# Patient Record
Sex: FEMALE | Race: WHITE | URBAN - METROPOLITAN AREA
[De-identification: names, ages, dates, MRNs, and addresses within clinical notes are randomized per-mention and may not be internally consistent; named-entity substitution may affect disease eponyms.]

---

## 2022-09-18 ENCOUNTER — HOSPITAL ENCOUNTER (OUTPATIENT)
Age: 32
Setting detail: OBSERVATION
Discharge: HOME | End: 2022-09-18
Attending: SURGERY | Admitting: SURGERY
Payer: COMMERCIAL

## 2022-09-18 VITALS
HEART RATE: 83 BPM | RESPIRATION RATE: 16 BRPM | TEMPERATURE: 98.8 F | WEIGHT: 132.2 LBS | HEIGHT: 62 IN | OXYGEN SATURATION: 97 % | BODY MASS INDEX: 24.33 KG/M2 | SYSTOLIC BLOOD PRESSURE: 105 MMHG | DIASTOLIC BLOOD PRESSURE: 62 MMHG

## 2022-09-18 PROBLEM — K35.80 ACUTE APPENDICITIS: Status: ACTIVE | Noted: 2022-09-18

## 2022-09-18 PROBLEM — R10.31 RLQ ABDOMINAL PAIN: Status: ACTIVE | Noted: 2022-09-18

## 2022-09-18 LAB
ANION GAP SERPL CALCULATED.3IONS-SCNC: 8 MMOL/L (ref 3–16)
BASOPHILS ABSOLUTE: 0.1 K/UL (ref 0–0.2)
BASOPHILS RELATIVE PERCENT: 0.6 %
BUN BLDV-MCNC: 16 MG/DL (ref 7–20)
CALCIUM SERPL-MCNC: 8.2 MG/DL (ref 8.3–10.6)
CHLORIDE BLD-SCNC: 104 MMOL/L (ref 99–110)
CO2: 25 MMOL/L (ref 21–32)
CREAT SERPL-MCNC: 0.6 MG/DL (ref 0.6–1.1)
EOSINOPHILS ABSOLUTE: 0.5 K/UL (ref 0–0.6)
EOSINOPHILS RELATIVE PERCENT: 6.1 %
GFR AFRICAN AMERICAN: >60
GFR NON-AFRICAN AMERICAN: >60
GLUCOSE BLD-MCNC: 85 MG/DL (ref 70–99)
HCT VFR BLD CALC: 40.7 % (ref 36–48)
HEMOGLOBIN: 13.7 G/DL (ref 12–16)
LACTIC ACID: 0.7 MMOL/L (ref 0.4–2)
LYMPHOCYTES ABSOLUTE: 3.1 K/UL (ref 1–5.1)
LYMPHOCYTES RELATIVE PERCENT: 35.5 %
MAGNESIUM: 1.7 MG/DL (ref 1.8–2.4)
MCH RBC QN AUTO: 31.5 PG (ref 26–34)
MCHC RBC AUTO-ENTMCNC: 33.8 G/DL (ref 31–36)
MCV RBC AUTO: 93.2 FL (ref 80–100)
MONOCYTES ABSOLUTE: 0.5 K/UL (ref 0–1.3)
MONOCYTES RELATIVE PERCENT: 6.2 %
NEUTROPHILS ABSOLUTE: 4.5 K/UL (ref 1.7–7.7)
NEUTROPHILS RELATIVE PERCENT: 51.6 %
PDW BLD-RTO: 13 % (ref 12.4–15.4)
PHOSPHORUS: 3.1 MG/DL (ref 2.5–4.9)
PLATELET # BLD: 173 K/UL (ref 135–450)
PMV BLD AUTO: 7.5 FL (ref 5–10.5)
POTASSIUM SERPL-SCNC: 3.9 MMOL/L (ref 3.5–5.1)
RBC # BLD: 4.37 M/UL (ref 4–5.2)
SODIUM BLD-SCNC: 137 MMOL/L (ref 136–145)
WBC # BLD: 8.7 K/UL (ref 4–11)

## 2022-09-18 PROCEDURE — 83735 ASSAY OF MAGNESIUM: CPT

## 2022-09-18 PROCEDURE — 2580000003 HC RX 258: Performed by: SURGERY

## 2022-09-18 PROCEDURE — 96365 THER/PROPH/DIAG IV INF INIT: CPT

## 2022-09-18 PROCEDURE — 96372 THER/PROPH/DIAG INJ SC/IM: CPT

## 2022-09-18 PROCEDURE — G0379 DIRECT REFER HOSPITAL OBSERV: HCPCS

## 2022-09-18 PROCEDURE — 36415 COLL VENOUS BLD VENIPUNCTURE: CPT

## 2022-09-18 PROCEDURE — 2500000003 HC RX 250 WO HCPCS: Performed by: SURGERY

## 2022-09-18 PROCEDURE — 96375 TX/PRO/DX INJ NEW DRUG ADDON: CPT

## 2022-09-18 PROCEDURE — 80048 BASIC METABOLIC PNL TOTAL CA: CPT

## 2022-09-18 PROCEDURE — 85025 COMPLETE CBC W/AUTO DIFF WBC: CPT

## 2022-09-18 PROCEDURE — 84100 ASSAY OF PHOSPHORUS: CPT

## 2022-09-18 PROCEDURE — 83605 ASSAY OF LACTIC ACID: CPT

## 2022-09-18 PROCEDURE — A4216 STERILE WATER/SALINE, 10 ML: HCPCS | Performed by: SURGERY

## 2022-09-18 PROCEDURE — 6360000002 HC RX W HCPCS: Performed by: SURGERY

## 2022-09-18 PROCEDURE — 99219 PR INITIAL OBSERVATION CARE/DAY 50 MINUTES: CPT | Performed by: SURGERY

## 2022-09-18 PROCEDURE — 96376 TX/PRO/DX INJ SAME DRUG ADON: CPT

## 2022-09-18 PROCEDURE — 6370000000 HC RX 637 (ALT 250 FOR IP): Performed by: SURGERY

## 2022-09-18 PROCEDURE — G0378 HOSPITAL OBSERVATION PER HR: HCPCS

## 2022-09-18 PROCEDURE — 96366 THER/PROPH/DIAG IV INF ADDON: CPT

## 2022-09-18 RX ORDER — ACETAMINOPHEN 325 MG/1
650 TABLET ORAL EVERY 6 HOURS PRN
Status: DISCONTINUED | OUTPATIENT
Start: 2022-09-18 | End: 2022-09-18 | Stop reason: HOSPADM

## 2022-09-18 RX ORDER — OXYCODONE HYDROCHLORIDE 5 MG/1
10 TABLET ORAL EVERY 4 HOURS PRN
Status: DISCONTINUED | OUTPATIENT
Start: 2022-09-18 | End: 2022-09-18 | Stop reason: HOSPADM

## 2022-09-18 RX ORDER — OXYCODONE HYDROCHLORIDE 5 MG/1
5 TABLET ORAL EVERY 4 HOURS PRN
Status: DISCONTINUED | OUTPATIENT
Start: 2022-09-18 | End: 2022-09-18 | Stop reason: HOSPADM

## 2022-09-18 RX ORDER — KETOROLAC TROMETHAMINE 30 MG/ML
30 INJECTION, SOLUTION INTRAMUSCULAR; INTRAVENOUS EVERY 8 HOURS PRN
Status: DISCONTINUED | OUTPATIENT
Start: 2022-09-18 | End: 2022-09-18 | Stop reason: HOSPADM

## 2022-09-18 RX ORDER — ENOXAPARIN SODIUM 100 MG/ML
40 INJECTION SUBCUTANEOUS EVERY 24 HOURS
Status: DISCONTINUED | OUTPATIENT
Start: 2022-09-18 | End: 2022-09-18 | Stop reason: SDUPTHER

## 2022-09-18 RX ORDER — SODIUM CHLORIDE 9 MG/ML
INJECTION, SOLUTION INTRAVENOUS CONTINUOUS
Status: DISCONTINUED | OUTPATIENT
Start: 2022-09-18 | End: 2022-09-18 | Stop reason: HOSPADM

## 2022-09-18 RX ORDER — ONDANSETRON 2 MG/ML
4 INJECTION INTRAMUSCULAR; INTRAVENOUS EVERY 6 HOURS PRN
Status: DISCONTINUED | OUTPATIENT
Start: 2022-09-18 | End: 2022-09-18 | Stop reason: HOSPADM

## 2022-09-18 RX ORDER — ENOXAPARIN SODIUM 100 MG/ML
40 INJECTION SUBCUTANEOUS EVERY 24 HOURS
Status: DISCONTINUED | OUTPATIENT
Start: 2022-09-18 | End: 2022-09-18 | Stop reason: HOSPADM

## 2022-09-18 RX ORDER — NICOTINE 21 MG/24HR
1 PATCH, TRANSDERMAL 24 HOURS TRANSDERMAL DAILY
Status: DISCONTINUED | OUTPATIENT
Start: 2022-09-18 | End: 2022-09-18 | Stop reason: HOSPADM

## 2022-09-18 RX ADMIN — KETOROLAC TROMETHAMINE 30 MG: 30 INJECTION, SOLUTION INTRAMUSCULAR; INTRAVENOUS at 02:31

## 2022-09-18 RX ADMIN — FAMOTIDINE 20 MG: 10 INJECTION INTRAVENOUS at 08:04

## 2022-09-18 RX ADMIN — ONDANSETRON HYDROCHLORIDE 4 MG: 2 INJECTION, SOLUTION INTRAMUSCULAR; INTRAVENOUS at 12:38

## 2022-09-18 RX ADMIN — MEROPENEM 1000 MG: 1 INJECTION, POWDER, FOR SOLUTION INTRAVENOUS at 02:36

## 2022-09-18 RX ADMIN — FAMOTIDINE 20 MG: 10 INJECTION INTRAVENOUS at 02:41

## 2022-09-18 RX ADMIN — MEROPENEM 1000 MG: 1 INJECTION, POWDER, FOR SOLUTION INTRAVENOUS at 10:43

## 2022-09-18 RX ADMIN — KETOROLAC TROMETHAMINE 30 MG: 30 INJECTION, SOLUTION INTRAMUSCULAR; INTRAVENOUS at 10:40

## 2022-09-18 RX ADMIN — ONDANSETRON HYDROCHLORIDE 4 MG: 2 INJECTION, SOLUTION INTRAMUSCULAR; INTRAVENOUS at 02:31

## 2022-09-18 RX ADMIN — ENOXAPARIN SODIUM 40 MG: 100 INJECTION SUBCUTANEOUS at 08:04

## 2022-09-18 RX ADMIN — SODIUM CHLORIDE: 9 INJECTION, SOLUTION INTRAVENOUS at 02:34

## 2022-09-18 ASSESSMENT — PAIN SCALES - GENERAL
PAINLEVEL_OUTOF10: 3
PAINLEVEL_OUTOF10: 5
PAINLEVEL_OUTOF10: 2
PAINLEVEL_OUTOF10: 5
PAINLEVEL_OUTOF10: 5

## 2022-09-18 ASSESSMENT — PAIN DESCRIPTION - DESCRIPTORS
DESCRIPTORS: ACHING

## 2022-09-18 ASSESSMENT — PAIN DESCRIPTION - ORIENTATION
ORIENTATION: RIGHT;LOWER
ORIENTATION: LOWER;RIGHT
ORIENTATION: RIGHT;LOWER

## 2022-09-18 ASSESSMENT — PAIN DESCRIPTION - LOCATION
LOCATION: ABDOMEN

## 2022-09-18 ASSESSMENT — LIFESTYLE VARIABLES: HOW OFTEN DO YOU HAVE A DRINK CONTAINING ALCOHOL: NEVER

## 2022-09-18 NOTE — PROGRESS NOTES
Patient resting in bed; is alert and oriented; call light in reach. Awaiting surgery consult. Vital signs stable. Remains NPO.

## 2022-09-18 NOTE — CARE COORDINATION
Review of chart for any potential discharge needs. No needs identified for discharge intervention at this time. MD and bedside RN  if needs arise please consult case management for discharge intervention. CM not following at this time. Pt's O2 sats are 97% on RA.

## 2022-09-18 NOTE — PROGRESS NOTES
Pt. admitted to [facility] Tele: No:    from [facility] ED    Present evaluation shows pt. is Alert    Telemetry shows No Tele ordered. Pt is young Acute appy pt admitted to surgery. VS   ED Triage Vitals   Enc Vitals Group      /60      Pulse 78      Resp 16      Temp 97.4      Temp src oral      SpO2 98       Weight       Height       Head Circumference       Peak Flow       Pain Score       Pain Loc       Pain Edu? Excl. in [address]?          O2 at 98 % on room air    Skin:   Pink    Respiration:   regular, no distress    Distress noted  no    Agree with unit placement of [facility] at 9/18/2022    Electronically Signed by: Electronically signed by Riya Brown RN on 9/18/22 at 1:51 AM EDT Pt. a

## 2022-09-18 NOTE — H&P
[facility]    Department of General Surgery History & Physical    PATIENT NAME: Jana Angeles   YOB: 1990    ADMISSION DATE: 2022  1:15 AM      TODAY'S DATE: 2022    Reason for admission:  Abdominal pain      HISTORY OF PRESENT ILLNESS:              The patient is a 33 y.o. female who presented to [facility] with fever, nausea and vomiting and headache and associated moderate abdominal pain in the mid and lower abdomen. Bowels are working. She is hungry. CT at [facility] showed dilated appendix but no inflammation. Her lactic acid was elevated. She feels much improved today. She denies abdominal pain now. Past Medical History:    No past medical history on file. Past Surgical History:        Procedure Laterality Date     SECTION Bilateral        Current Medications:   Current Facility-Administered Medications: 0.9 % sodium chloride infusion, , IntraVENous, Continuous  HYDROmorphone (DILAUDID) injection 0.25 mg, 0.25 mg, IntraVENous, Q4H PRN  ketorolac (TORADOL) injection 30 mg, 30 mg, IntraVENous, Q8H PRN  famotidine (PEPCID) 20 mg in sodium chloride (PF) 10 mL injection, 20 mg, IntraVENous, BID  ondansetron (ZOFRAN) injection 4 mg, 4 mg, IntraVENous, Q6H PRN  acetaminophen (TYLENOL) tablet 650 mg, 650 mg, Oral, Q6H PRN  [COMPLETED] meropenem (MERREM) 1,000 mg in sodium chloride 0.9 % 100 mL IVPB (mini-bag), 1,000 mg, IntraVENous, Once **FOLLOWED BY** meropenem (MERREM) 1,000 mg in sodium chloride 0.9 % 100 mL IVPB (mini-bag), 1,000 mg, IntraVENous, Q8H  enoxaparin (LOVENOX) injection 40 mg, 40 mg, SubCUTAneous, Q24H  nicotine (NICODERM CQ) 14 MG/24HR 1 patch, 1 patch, TransDERmal, Daily  oxyCODONE (ROXICODONE) immediate release tablet 5 mg, 5 mg, Oral, Q4H PRN **OR** oxyCODONE (ROXICODONE) immediate release tablet 10 mg, 10 mg, Oral, Q4H PRN  Prior to Admission medications    Not on File        Allergies:  Patient has no known allergies.     Social History: TOBACCO:   reports that she does not have a smoking history on file. She has never used smokeless tobacco.  ETOH:   reports no history of alcohol use. DRUGS:   reports current drug use. Drug: IV. Family History:    No family history on file. REVIEW OF SYSTEMS:  She reports no complaints related to the eyes, ears , nose throat or mouth. She denies weight loss. No chest pain. No SOB. No urinary complaints. No musculoskeletal complaints. No skin rashes. No neurologic deficits. No bleeding tendencies. GI complaints include none currently. PHYSICAL EXAM:  VITALS:  BP 94/62   Pulse 81   Temp 99.2 °F (37.3 °C) (Oral)   Resp 16   Ht 5' 2\" (1.575 m)   Wt 132 lb 3.2 oz (60 kg)   LMP 08/20/2022   SpO2 99%   BMI 24.18 kg/m²     CONSTITUTIONAL:  alert, no apparent distress and thin  EYES:  sclera clear  ENT:  normocepalic, without obvious abnormality  NECK:  supple, symmetrical, trachea midline   LUNGS:  clear to auscultation  CARDIOVASCULAR:  regular rate and rhythm   ABDOMEN:     non-distended, non-tender even with deep palpation in RLQ ,and soft  MUSCULOSKELETAL:  No pitting edema lower extremities  NEUROLOGIC:  Mental Status Exam:  Level of Alertness:   awake  Orientation:   person, place, time  SKIN:  no rashes    DATA:    CBC:   Recent Labs     09/18/22  0552   WBC 8.7   HGB 13.7   HCT 40.7        BMP:    Recent Labs     09/18/22  0552      K 3.9      CO2 25   BUN 16   CREATININE 0.6   GLUCOSE 85         Radiology Review:  CT at [facility] with dilated appendix without inflammation    ASSESSMENT:  RLQ abdominal pain - resolved  Lactic acidosis - resolved    PLAN:  Suspect she had dehydration from N/V likely from GI illness with N/V/D and fever. Exam and history not consistent with appendicitis and CT non diagnostic. Full liquids trial and discharge if she tolerates.       Paty Carter MD     [address] Surgery

## 2022-09-18 NOTE — DISCHARGE SUMMARY
Surgery Discharge Summary    Patient Identification  Barbara Lewis is a 33 y.o. female. :  1990  Admit Date:  2022    Discharge date:   No discharge date for patient encounter. Disposition: home    Discharge Diagnoses:   Patient Active Problem List   Diagnosis    Mood disorder, drug-induced ([facility] [address]    Narcotic withdrawal ([facility] [address]    Complicated grief    Acute appendicitis    RLQ abdominal pain         Consults: none    Surgery: None    Patient Instructions: Activity: activity as tolerated  Diet: clear liquids, advance as tolerated  Wound Care: none needed    Follow-up with PCP in 2 weeks. See pre-printed instructions in chart and given to patient upon discharge. Discharge Medications:        Medication List      You have not been prescribed any medications. Condition at discharge: Stable    HPI and Hospital Course: Admitted with RLQ pain. Improved with hydration and time. Tolerated PO. Benign abdominal exam.  Normal labs. Follow up as needed.          Jerri Jauregui MD    [facility] Surgery

## 2024-07-08 ENCOUNTER — HOSPITAL ENCOUNTER (INPATIENT)
Age: 34
LOS: 2 days | Discharge: HOME | End: 2024-07-11
Attending: STUDENT IN AN ORGANIZED HEALTH CARE EDUCATION/TRAINING PROGRAM | Admitting: PSYCHIATRY & NEUROLOGY
Payer: COMMERCIAL

## 2024-07-08 DIAGNOSIS — F11.23 OPIOID DEPENDENCE WITH WITHDRAWAL (HCC): Primary | ICD-10-CM

## 2024-07-08 DIAGNOSIS — F29 PSYCHOSIS, UNSPECIFIED PSYCHOSIS TYPE (HCC): ICD-10-CM

## 2024-07-08 LAB
ALBUMIN SERPL-MCNC: 4 G/DL (ref 3.4–5)
ALBUMIN/GLOB SERPL: 1.4 {RATIO} (ref 1.1–2.2)
ALP SERPL-CCNC: 57 U/L (ref 40–129)
ALT SERPL-CCNC: 31 U/L (ref 10–40)
AMPHETAMINES UR QL SCN>1000 NG/ML: ABNORMAL
ANION GAP SERPL CALCULATED.3IONS-SCNC: 9 MMOL/L (ref 3–16)
APAP SERPL-MCNC: <5 UG/ML (ref 10–30)
AST SERPL-CCNC: 37 U/L (ref 15–37)
BARBITURATES UR QL SCN>200 NG/ML: ABNORMAL
BASOPHILS # BLD: 0.1 K/UL (ref 0–0.2)
BASOPHILS NFR BLD: 0.9 %
BENZODIAZ UR QL SCN>200 NG/ML: ABNORMAL
BILIRUB SERPL-MCNC: <0.2 MG/DL (ref 0–1)
BILIRUB UR QL STRIP.AUTO: NEGATIVE
BUN SERPL-MCNC: 5 MG/DL (ref 7–20)
CALCIUM SERPL-MCNC: 9.1 MG/DL (ref 8.3–10.6)
CANNABINOIDS UR QL SCN>50 NG/ML: ABNORMAL
CHLORIDE SERPL-SCNC: 102 MMOL/L (ref 99–110)
CLARITY UR: CLEAR
CO2 SERPL-SCNC: 27 MMOL/L (ref 21–32)
COCAINE UR QL SCN: ABNORMAL
COLOR UR: YELLOW
CREAT SERPL-MCNC: <0.5 MG/DL (ref 0.6–1.1)
DEPRECATED RDW RBC AUTO: 13.4 % (ref 12.4–15.4)
DRUG SCREEN COMMENT UR-IMP: ABNORMAL
EOSINOPHIL # BLD: 0.3 K/UL (ref 0–0.6)
EOSINOPHIL NFR BLD: 5 %
ETHANOLAMINE SERPL-MCNC: NORMAL MG/DL (ref 0–0.08)
FENTANYL SCREEN, URINE: POSITIVE
GFR SERPLBLD CREATININE-BSD FMLA CKD-EPI: >90 ML/MIN/{1.73_M2}
GLUCOSE SERPL-MCNC: 107 MG/DL (ref 70–99)
GLUCOSE UR STRIP.AUTO-MCNC: NEGATIVE MG/DL
HCG SERPL QL: NEGATIVE
HCT VFR BLD AUTO: 39.7 % (ref 36–48)
HGB BLD-MCNC: 13.5 G/DL (ref 12–16)
HGB UR QL STRIP.AUTO: NEGATIVE
KETONES UR STRIP.AUTO-MCNC: NEGATIVE MG/DL
LEUKOCYTE ESTERASE UR QL STRIP.AUTO: NEGATIVE
LYMPHOCYTES # BLD: 2.3 K/UL (ref 1–5.1)
LYMPHOCYTES NFR BLD: 34.8 %
MAGNESIUM SERPL-MCNC: 1.9 MG/DL (ref 1.8–2.4)
MCH RBC QN AUTO: 32.3 PG (ref 26–34)
MCHC RBC AUTO-ENTMCNC: 34.1 G/DL (ref 31–36)
MCV RBC AUTO: 94.6 FL (ref 80–100)
METHADONE UR QL SCN>300 NG/ML: ABNORMAL
MONOCYTES # BLD: 0.5 K/UL (ref 0–1.3)
MONOCYTES NFR BLD: 7.9 %
NEUTROPHILS # BLD: 3.4 K/UL (ref 1.7–7.7)
NEUTROPHILS NFR BLD: 51.4 %
NITRITE UR QL STRIP.AUTO: NEGATIVE
OPIATES UR QL SCN>300 NG/ML: ABNORMAL
OXYCODONE UR QL SCN: ABNORMAL
PCP UR QL SCN>25 NG/ML: ABNORMAL
PH UR STRIP.AUTO: 7.5 [PH] (ref 5–8)
PH UR STRIP: 7.5 [PH]
PLATELET # BLD AUTO: 192 K/UL (ref 135–450)
PMV BLD AUTO: 8.4 FL (ref 5–10.5)
POTASSIUM SERPL-SCNC: 3.5 MMOL/L (ref 3.5–5.1)
PROT SERPL-MCNC: 6.9 G/DL (ref 6.4–8.2)
PROT UR STRIP.AUTO-MCNC: NEGATIVE MG/DL
RBC # BLD AUTO: 4.2 M/UL (ref 4–5.2)
SALICYLATES SERPL-MCNC: <0.3 MG/DL (ref 15–30)
SODIUM SERPL-SCNC: 138 MMOL/L (ref 136–145)
SP GR UR STRIP.AUTO: 1.02 (ref 1–1.03)
UA COMPLETE W REFLEX CULTURE PNL UR: NORMAL
UA DIPSTICK W REFLEX MICRO PNL UR: NORMAL
URN SPEC COLLECT METH UR: NORMAL
UROBILINOGEN UR STRIP-ACNC: 0.2 E.U./DL
WBC # BLD AUTO: 6.6 K/UL (ref 4–11)

## 2024-07-08 PROCEDURE — 80307 DRUG TEST PRSMV CHEM ANLYZR: CPT

## 2024-07-08 PROCEDURE — 36415 COLL VENOUS BLD VENIPUNCTURE: CPT

## 2024-07-08 PROCEDURE — 80053 COMPREHEN METABOLIC PANEL: CPT

## 2024-07-08 PROCEDURE — 84703 CHORIONIC GONADOTROPIN ASSAY: CPT

## 2024-07-08 PROCEDURE — 84443 ASSAY THYROID STIM HORMONE: CPT

## 2024-07-08 PROCEDURE — 83735 ASSAY OF MAGNESIUM: CPT

## 2024-07-08 PROCEDURE — 80143 DRUG ASSAY ACETAMINOPHEN: CPT

## 2024-07-08 PROCEDURE — 81003 URINALYSIS AUTO W/O SCOPE: CPT

## 2024-07-08 PROCEDURE — 82077 ASSAY SPEC XCP UR&BREATH IA: CPT

## 2024-07-08 PROCEDURE — 99285 EMERGENCY DEPT VISIT HI MDM: CPT

## 2024-07-08 PROCEDURE — 80179 DRUG ASSAY SALICYLATE: CPT

## 2024-07-08 PROCEDURE — 85025 COMPLETE CBC W/AUTO DIFF WBC: CPT

## 2024-07-08 PROCEDURE — 84439 ASSAY OF FREE THYROXINE: CPT

## 2024-07-08 ASSESSMENT — PAIN - FUNCTIONAL ASSESSMENT: PAIN_FUNCTIONAL_ASSESSMENT: 0-10

## 2024-07-08 ASSESSMENT — PAIN DESCRIPTION - LOCATION: LOCATION: BACK

## 2024-07-08 ASSESSMENT — PAIN DESCRIPTION - DESCRIPTORS: DESCRIPTORS: ACHING

## 2024-07-08 ASSESSMENT — PAIN DESCRIPTION - ORIENTATION: ORIENTATION: LOWER

## 2024-07-08 ASSESSMENT — PAIN SCALES - GENERAL: PAINLEVEL_OUTOF10: 6

## 2024-07-09 PROBLEM — F29 PSYCHOSIS, UNSPECIFIED PSYCHOSIS TYPE (HCC): Status: ACTIVE | Noted: 2024-07-09

## 2024-07-09 LAB
EKG ATRIAL RATE: 82 BPM
EKG ATRIAL RATE: 90 BPM
EKG DIAGNOSIS: NORMAL
EKG DIAGNOSIS: NORMAL
EKG P AXIS: 68 DEGREES
EKG P AXIS: 72 DEGREES
EKG P-R INTERVAL: 146 MS
EKG P-R INTERVAL: 146 MS
EKG Q-T INTERVAL: 356 MS
EKG Q-T INTERVAL: 362 MS
EKG QRS DURATION: 94 MS
EKG QRS DURATION: 96 MS
EKG QTC CALCULATION (BAZETT): 415 MS
EKG QTC CALCULATION (BAZETT): 442 MS
EKG R AXIS: 73 DEGREES
EKG R AXIS: 75 DEGREES
EKG T AXIS: 55 DEGREES
EKG T AXIS: 58 DEGREES
EKG VENTRICULAR RATE: 82 BPM
EKG VENTRICULAR RATE: 90 BPM
T4 FREE SERPL-MCNC: 2.1 NG/DL (ref 0.9–1.8)
TROPONIN, HIGH SENSITIVITY: 6 NG/L (ref 0–14)
TSH SERPL DL<=0.005 MIU/L-ACNC: 0.09 UIU/ML (ref 0.27–4.2)

## 2024-07-09 PROCEDURE — 83036 HEMOGLOBIN GLYCOSYLATED A1C: CPT

## 2024-07-09 PROCEDURE — 83690 ASSAY OF LIPASE: CPT

## 2024-07-09 PROCEDURE — 93005 ELECTROCARDIOGRAM TRACING: CPT

## 2024-07-09 PROCEDURE — 6360000002 HC RX W HCPCS: Performed by: STUDENT IN AN ORGANIZED HEALTH CARE EDUCATION/TRAINING PROGRAM

## 2024-07-09 PROCEDURE — 6370000000 HC RX 637 (ALT 250 FOR IP): Performed by: STUDENT IN AN ORGANIZED HEALTH CARE EDUCATION/TRAINING PROGRAM

## 2024-07-09 PROCEDURE — 84484 ASSAY OF TROPONIN QUANT: CPT

## 2024-07-09 PROCEDURE — 93005 ELECTROCARDIOGRAM TRACING: CPT | Performed by: NURSE PRACTITIONER

## 2024-07-09 PROCEDURE — 80061 LIPID PANEL: CPT

## 2024-07-09 PROCEDURE — 93010 ELECTROCARDIOGRAM REPORT: CPT | Performed by: INTERNAL MEDICINE

## 2024-07-09 PROCEDURE — 6370000000 HC RX 637 (ALT 250 FOR IP)

## 2024-07-09 PROCEDURE — 36415 COLL VENOUS BLD VENIPUNCTURE: CPT

## 2024-07-09 PROCEDURE — 99223 1ST HOSP IP/OBS HIGH 75: CPT

## 2024-07-09 PROCEDURE — 6370000000 HC RX 637 (ALT 250 FOR IP): Performed by: NURSE PRACTITIONER

## 2024-07-09 PROCEDURE — 1240000000 HC EMOTIONAL WELLNESS R&B

## 2024-07-09 RX ORDER — PROMETHAZINE HYDROCHLORIDE 25 MG/1
25 TABLET ORAL EVERY 6 HOURS PRN
Status: DISCONTINUED | OUTPATIENT
Start: 2024-07-09 | End: 2024-07-11 | Stop reason: HOSPADM

## 2024-07-09 RX ORDER — BUPRENORPHINE 2 MG/1
4 TABLET SUBLINGUAL PRN
Status: DISCONTINUED | OUTPATIENT
Start: 2024-07-09 | End: 2024-07-11 | Stop reason: HOSPADM

## 2024-07-09 RX ORDER — NICOTINE 21 MG/24HR
1 PATCH, TRANSDERMAL 24 HOURS TRANSDERMAL DAILY
Status: DISCONTINUED | OUTPATIENT
Start: 2024-07-09 | End: 2024-07-11 | Stop reason: HOSPADM

## 2024-07-09 RX ORDER — KETOROLAC TROMETHAMINE 15 MG/ML
15 INJECTION, SOLUTION INTRAMUSCULAR; INTRAVENOUS ONCE
Status: COMPLETED | OUTPATIENT
Start: 2024-07-09 | End: 2024-07-09

## 2024-07-09 RX ORDER — ACETAMINOPHEN 325 MG/1
650 TABLET ORAL EVERY 4 HOURS PRN
Status: DISCONTINUED | OUTPATIENT
Start: 2024-07-09 | End: 2024-07-11 | Stop reason: HOSPADM

## 2024-07-09 RX ORDER — MAGNESIUM HYDROXIDE/ALUMINUM HYDROXICE/SIMETHICONE 120; 1200; 1200 MG/30ML; MG/30ML; MG/30ML
30 SUSPENSION ORAL EVERY 6 HOURS PRN
Status: DISCONTINUED | OUTPATIENT
Start: 2024-07-09 | End: 2024-07-11 | Stop reason: HOSPADM

## 2024-07-09 RX ORDER — OLANZAPINE 10 MG/1
10 TABLET ORAL EVERY 4 HOURS PRN
Status: DISCONTINUED | OUTPATIENT
Start: 2024-07-09 | End: 2024-07-11 | Stop reason: HOSPADM

## 2024-07-09 RX ORDER — POLYETHYLENE GLYCOL 3350 17 G
2 POWDER IN PACKET (EA) ORAL
Status: DISCONTINUED | OUTPATIENT
Start: 2024-07-09 | End: 2024-07-11 | Stop reason: HOSPADM

## 2024-07-09 RX ORDER — BENZTROPINE MESYLATE 1 MG/ML
2 INJECTION INTRAMUSCULAR; INTRAVENOUS 2 TIMES DAILY PRN
Status: DISCONTINUED | OUTPATIENT
Start: 2024-07-09 | End: 2024-07-11 | Stop reason: HOSPADM

## 2024-07-09 RX ORDER — IBUPROFEN 400 MG/1
400 TABLET ORAL EVERY 6 HOURS PRN
Status: DISCONTINUED | OUTPATIENT
Start: 2024-07-09 | End: 2024-07-11 | Stop reason: HOSPADM

## 2024-07-09 RX ORDER — ACETAMINOPHEN 325 MG/1
650 TABLET ORAL ONCE
Status: COMPLETED | OUTPATIENT
Start: 2024-07-09 | End: 2024-07-09

## 2024-07-09 RX ORDER — TRAZODONE HYDROCHLORIDE 50 MG/1
50 TABLET ORAL NIGHTLY PRN
Status: DISCONTINUED | OUTPATIENT
Start: 2024-07-09 | End: 2024-07-11 | Stop reason: HOSPADM

## 2024-07-09 RX ORDER — DICYCLOMINE HCL 20 MG
20 TABLET ORAL EVERY 6 HOURS PRN
Status: DISCONTINUED | OUTPATIENT
Start: 2024-07-09 | End: 2024-07-11 | Stop reason: HOSPADM

## 2024-07-09 RX ORDER — HYDROXYZINE 50 MG/1
50 TABLET, FILM COATED ORAL 3 TIMES DAILY PRN
Status: DISCONTINUED | OUTPATIENT
Start: 2024-07-09 | End: 2024-07-11 | Stop reason: HOSPADM

## 2024-07-09 RX ORDER — NICOTINE 21 MG/24HR
1 PATCH, TRANSDERMAL 24 HOURS TRANSDERMAL
Status: DISCONTINUED | OUTPATIENT
Start: 2024-07-09 | End: 2024-07-09 | Stop reason: HOSPADM

## 2024-07-09 RX ADMIN — NICOTINE POLACRILEX 2 MG: 2 LOZENGE ORAL at 20:01

## 2024-07-09 RX ADMIN — KETOROLAC TROMETHAMINE 15 MG: 15 INJECTION, SOLUTION INTRAMUSCULAR; INTRAVENOUS at 02:01

## 2024-07-09 RX ADMIN — ACETAMINOPHEN 650 MG: 325 TABLET ORAL at 01:25

## 2024-07-09 RX ADMIN — OLANZAPINE 10 MG: 10 TABLET, FILM COATED ORAL at 23:04

## 2024-07-09 RX ADMIN — PROMETHAZINE HYDROCHLORIDE 25 MG: 25 TABLET ORAL at 23:04

## 2024-07-09 RX ADMIN — IBUPROFEN 400 MG: 400 TABLET, FILM COATED ORAL at 03:09

## 2024-07-09 RX ADMIN — HYDROXYZINE HYDROCHLORIDE 50 MG: 50 TABLET, FILM COATED ORAL at 21:46

## 2024-07-09 RX ADMIN — HYDROXYZINE HYDROCHLORIDE 50 MG: 50 TABLET, FILM COATED ORAL at 03:10

## 2024-07-09 RX ADMIN — TRAZODONE HYDROCHLORIDE 50 MG: 50 TABLET ORAL at 21:46

## 2024-07-09 RX ADMIN — BUPRENORPHINE 4 MG: 2 TABLET SUBLINGUAL at 20:25

## 2024-07-09 ASSESSMENT — PAIN DESCRIPTION - FREQUENCY: FREQUENCY: INTERMITTENT

## 2024-07-09 ASSESSMENT — PAIN DESCRIPTION - LOCATION: LOCATION: GENERALIZED

## 2024-07-09 ASSESSMENT — SLEEP AND FATIGUE QUESTIONNAIRES
DO YOU USE A SLEEP AID: NO
AVERAGE NUMBER OF SLEEP HOURS: 3
DO YOU HAVE DIFFICULTY SLEEPING: YES
DO YOU USE A SLEEP AID: NO
DO YOU HAVE DIFFICULTY SLEEPING: YES
AVERAGE NUMBER OF SLEEP HOURS: 3
SLEEP PATTERN: DIFFICULTY FALLING ASLEEP;DISTURBED/INTERRUPTED SLEEP
SLEEP PATTERN: DIFFICULTY FALLING ASLEEP;DISTURBED/INTERRUPTED SLEEP;RESTLESSNESS

## 2024-07-09 ASSESSMENT — PATIENT HEALTH QUESTIONNAIRE - PHQ9
SUM OF ALL RESPONSES TO PHQ QUESTIONS 1-9: 2
SUM OF ALL RESPONSES TO PHQ QUESTIONS 1-9: 2
SUM OF ALL RESPONSES TO PHQ9 QUESTIONS 1 & 2: 2
1. LITTLE INTEREST OR PLEASURE IN DOING THINGS: SEVERAL DAYS
SUM OF ALL RESPONSES TO PHQ QUESTIONS 1-9: 2
SUM OF ALL RESPONSES TO PHQ QUESTIONS 1-9: 2
2. FEELING DOWN, DEPRESSED OR HOPELESS: SEVERAL DAYS

## 2024-07-09 ASSESSMENT — PAIN SCALES - GENERAL
PAINLEVEL_OUTOF10: 0
PAINLEVEL_OUTOF10: 5
PAINLEVEL_OUTOF10: 0

## 2024-07-09 ASSESSMENT — PAIN DESCRIPTION - ONSET: ONSET: GRADUAL

## 2024-07-09 ASSESSMENT — LIFESTYLE VARIABLES
HOW OFTEN DO YOU HAVE A DRINK CONTAINING ALCOHOL: NEVER
HOW MANY STANDARD DRINKS CONTAINING ALCOHOL DO YOU HAVE ON A TYPICAL DAY: PATIENT DOES NOT DRINK

## 2024-07-09 ASSESSMENT — PAIN - FUNCTIONAL ASSESSMENT: PAIN_FUNCTIONAL_ASSESSMENT: ACTIVITIES ARE NOT PREVENTED

## 2024-07-09 ASSESSMENT — PAIN DESCRIPTION - PAIN TYPE: TYPE: ACUTE PAIN

## 2024-07-09 ASSESSMENT — PAIN DESCRIPTION - DESCRIPTORS: DESCRIPTORS: ACHING

## 2024-07-09 NOTE — CARE COORDINATION
07/09/24 0945   Activities of Daily Living   Patient Requires assistance with daily self-care activities? No   Leisure Activity 1   3 Favorite Leisure Activities listen to music   Frequency > 2 hours/day   Last time this week   Barriers to participating    (\" the other day voices telling me not to listen to music\")   Leisure Activity 2   Favorite Leisure Activities  color   Frequency  weekly   Last time  this week  (last night)   Leisure Activity 3   Favorite Leisure Activities  family time   Frequency  > 2 hours/day   Last time  this week   Barriers to participating    (depression, loss of daugher 15 years ago and pt is now grieving)   Social   Patient reports spending the majority of their free time alone   Patient verbalizes a preference for spending free time with one other person   Patient’s perception of support system healthy/strong   Patient’s perception of barriers to socializing with others include(s) no perceived barriers   Beliefs & Coping   Has difficulty dealing with feelings   Yes   Internalizes feelings/Keeps feelings in No   Externalizes feelings through aggressiveness or poor temper control  Yes   Feels uncomfortable around others  Yes   Has difficulty talking to others  No   Depends on others for direction or decisions Yes   Difficulty dealing with anger of others  Yes   Difficulty dealing with own anger  No   Difficulty managing stress Yes   Frequently has difficulty with relationships  Yes   which,who,where in family   Has recently perceived/experienced loss, disappointment, humiliation or failure  Yes  (daughters death 12-13 years ago)   General perception about self likes self   Attitude about abilities more successful than not   Locus of Control  always   Belief about recovery Recovery is possible   Patient Identified Strengths  \"doing the right things\"   Patient Identified Limitations  depression before last year   Perception of most stressful event prior to hospitalization \"my daughter's  death\" \"I started grieving this week\"   Perception of changes needed grieve over daughter and babys dad that passed away. \"that put a lot on me\"   Strengths and Limitations   Strengths Independent in basic self-care activities;Demonstrates basic social skills;Positive leisure interests;General positive attitude toward future and recovery   Limitations Tendency to isolate self;Difficult relationships / poor social skills;External locus of control;Difficulty problem solving/relies on others to help solve problems      met with pt and completed leisure assessment. Pt having a lot of difficulty being focused and answering questions for assessment.

## 2024-07-09 NOTE — PROGRESS NOTES
Pt is currently not prescribed home medications. However, she informed staff that she needs Miralax, Potassium, and subutex.  She claims that she was supposed to go to [facility] to start Subutex, but never went.
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
## 2024-07-09 NOTE — GROUP NOTE
Group Therapy Note    Date: 7/9/2024    Group Start Time: 1000  Group End Time: 1045  Group Topic: Psychoeducation    [facility]    Thalia Monk LISW        Group Therapy Note    Attendees: 7       Patient's Goal:  to learn and discuss the communication styles of being assertive, passive and aggressive and that communicating in an assertive manner is the healthiest way to communicate. Pt's asked to apply to themselves.    Notes:  pt attended group for the full duration. She actively participated in group discussion and was able to apply to herself. Pt was unable to focus at times when answering questions.     Status After Intervention:  Improved    Participation Level: Active Listener and Interactive    Participation Quality: Appropriate, Attentive, and Sharing      Speech:  normal      Thought Process/Content: Logical      Affective Functioning: Congruent      Mood: anxious      Level of consciousness:  Alert and Attentive      Response to Learning: Able to verbalize current knowledge/experience, Able to verbalize/acknowledge new learning, and Progressing to goal      Endings: None Reported    Modes of Intervention: Education, Support, Socialization, Exploration, and Clarifying      Discipline Responsible: /Counselor      Signature:  MICKIE Hoyt
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
## 2024-07-09 NOTE — PROGRESS NOTES
Sophia requested and received atarax 50 mg po for c/o anxiety and motrin 400 g po for c/o generalized aches and pains that she rated at a 5.

## 2024-07-09 NOTE — PROGRESS NOTES
Behavioral Services  Medicare Certification Upon Admission    I certify that this patient's inpatient psychiatric hospital admission is medically necessary for:    [x] (1) Treatment which could reasonably be expected to improve this patient's condition,       [x] (2) Or for diagnostic study;     AND     [x](2) The inpatient psychiatric services are provided while the individual is under the care of a physician and are included in the individualized plan of care.    Estimated length of stay/service 2-5 days    Plan for post-hospital care outpatient support    Electronically signed by PEPE Rodriguez CNP on 7/9/2024 at 8:53 AM

## 2024-07-09 NOTE — PROGRESS NOTES
CSSR-S Assessment completed with patient who then scored MODERATE RISK.     Nurse Dominique Slater was notified of MODERATE RISK score, via Telephone at 0340.      Were suicide precautions ordered: NO    If not ordered, justification as follows: Patient stated that she's not suicidal, she just doesn't feel real well right now. She said that she wouldn't hurt herself because she is in a relationship, has future goals to find employment and she couldn't do that to her sister.    Completed by: Obdulio RN/BC
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
## 2024-07-09 NOTE — PLAN OF CARE
Problem: Self Harm/Suicidality  Goal: Will have no self-injury during hospital stay  Description: INTERVENTIONS:  1.  Ensure constant observer at bedside with Q15M safety checks  2.  Maintain a safe environment  3.  Secure patient belongings  4.  Ensure family/visitors adhere to safety recommendations  5.  Ensure safety tray has been added to patient's diet order  6.  Every shift and PRN: Re-assess suicidal risk via Frequent Screener    Outcome: Progressing     Problem: Pain  Goal: Verbalizes/displays adequate comfort level or baseline comfort level  Outcome: Not Progressing     Problem: Anxiety  Goal: Will report anxiety at manageable levels  Description: INTERVENTIONS:  1. Administer medication as ordered  2. Teach and rehearse alternative coping skills  3. Provide emotional support with 1:1 interaction with staff  Outcome: Not Progressing     Problem: Confusion  Goal: Confusion, delirium, dementia, or psychosis is improved or at baseline  Description: INTERVENTIONS:  1. Assess for possible contributors to thought disturbance, including medications, impaired vision or hearing, underlying metabolic abnormalities, dehydration, psychiatric diagnoses, and notify attending LIP  2. [place] high risk fall precautions, as indicated  3. Provide frequent short contacts to provide reality reorientation, refocusing and direction  4. Decrease environmental stimuli, including noise as appropriate  5. Monitor and intervene to maintain adequate nutrition, hydration, elimination, sleep and activity  6. If unable to ensure safety without constant attention obtain sitter and review sitter guidelines with assigned personnel  7. Initiate Psychosocial CNS and Spiritual Care consult, as indicated  Outcome: Not Progressing

## 2024-07-09 NOTE — PLAN OF CARE
[facility]  Treatment Team Note  Review Date & Time: 07/09/24  0952    Patient was not in treatment team      Status EXAM:   Mental Status and Behavioral Exam  Normal: No  Level of Assistance: Independent/Self  Facial Expression: Brightened, Worried  Affect: Appropriate, Congruent  Level of Consciousness: Alert  Frequency of Checks: 4 times per hour, close  Mood:Normal: No  Mood: Depressed, Anxious, Helpless  Motor Activity:Normal: Yes  Eye Contact: Good  Observed Behavior: Cooperative, Friendly, Withdrawn, Guarded  Sexual Misconduct History: Current - no  Preception: Weld to person, Weld to time, Weld to place  Attention:Normal: No  Attention: Distractible  Thought Processes: Blocking  Thought Content:Normal: No  Thought Content: Paranoia, Poverty of content  Depression Symptoms: Appetite change, Change in energy level, Feelings of helplessness  Anxiety Symptoms: Generalized  Gisele Symptoms: No problems reported or observed.  Hallucinations: Auditory (comment) (hears her ex's voice at times.)  Delusions: Yes  Delusions: Paranoid, Persecutory  Memory:Normal: No  Memory: Poor recent  Insight and Judgment: No  Insight and Judgment: Poor judgment, Poor insight      Suicide Risk CSSR-S:  1) Within the past month, have you wished you were dead or wished you could go to sleep and not wake up? : No  2) Have you actually had any thoughts of killing yourself? : No  3) Have you been thinking about how you might kill yourself? : No  5) Have you started to work out or worked out the details of how to kill yourself? Do you intend to carry out this plan? : No  6) Have you ever done anything, started to do anything, or prepared to do anything to end your life?: Yes      PLAN/TREATMENT RECOMMENDATIONS UPDATE:   Patient will take medication as prescribed, eat 75% of meals, attend groups, participate in milieu activities, participate in treatment team and care planning for discharge and follow up.

## 2024-07-09 NOTE — PROGRESS NOTES
Patient arrived to the [facility] from the emergency room department. She is alert and oriented x 3, she said that she is here because her ex was sentenced to 15 years to life and he is now attempting to get out paroled out of skilled nursing. She is cooperative with admission process.

## 2024-07-09 NOTE — TRANSFER CENTER NOTE
Transfer Center Handoff for Behavioral Health Transfers      Patient's Current Location: [facility] ED     Chief Complaint   Patient presents with    Psychiatric Evaluation     Sister concerned for judi and paranoia. Ongoing for a couple months.        Current or History of Violent Behavior: No    Currently in Restraints Now or During this Encounter: No  (Specify if Agitation or self harm is noted in ED?)  If yes, please describe behaviors requiring restraint:             Medical Clearance Documented and Verified in the Chart: Yes    No Known Allergies     Can Patient Tolerate Lying Flat: Yes    Able to Perform ADLs:  Yes  (Specify if able to ambulate or uses any mobility devices such as cane or walker)  Activity:    Level of Assistance:    Assistive Device:    Miscellaneous Devices:      LABS    CBC:   Lab Results   Component Value Date/Time    WBC 6.6 07/08/2024 09:13 PM    RBC 4.20 07/08/2024 09:13 PM    HGB 13.5 07/08/2024 09:13 PM    HCT 39.7 07/08/2024 09:13 PM    MCV 94.6 07/08/2024 09:13 PM    MCH 32.3 07/08/2024 09:13 PM    MCHC 34.1 07/08/2024 09:13 PM    RDW 13.4 07/08/2024 09:13 PM     07/08/2024 09:13 PM    MPV 8.4 07/08/2024 09:13 PM     CMP:   Lab Results   Component Value Date/Time     07/08/2024 09:15 PM    K 3.5 07/08/2024 09:15 PM     07/08/2024 09:15 PM    CO2 27 07/08/2024 09:15 PM    BUN 5 07/08/2024 09:15 PM    CREATININE <0.5 07/08/2024 09:15 PM    GFRAA >60 09/18/2022 05:52 AM    AGRATIO 1.4 07/08/2024 09:15 PM    LABGLOM >90 07/08/2024 09:15 PM    GLUCOSE 107 07/08/2024 09:15 PM    CALCIUM 9.1 07/08/2024 09:15 PM    BILITOT <0.2 07/08/2024 09:15 PM    ALKPHOS 57 07/08/2024 09:15 PM    AST 37 07/08/2024 09:15 PM    ALT 31 07/08/2024 09:15 PM     Drug Panel: No results found for: \"AMPHETAMUR\", \"BARBITURATUR\", \"COCAINEUR\", \"METHADU\", \"OPIAU\", \"THCUR\", \"LABCOMM\"  UA:  Lab Results   Component Value Date/Time    COLORU Yellow 07/08/2024 09:13 PM    PROTEINU

## 2024-07-09 NOTE — H&P
INITIAL PSYCHIATRIC HISTORY AND PHYSICAL      Patient name: Sophia Harrison  Admit date: 7/8/2024  Today's date: 7/9/2024           CC:  Psychosis    HPI:   Patient seen in room on Adult Behavioral Unit.   Patient is a 34 y.o. female who presents  to [place] ED for judi and paranoia in need of a mental health exam.   Per tele-psych notes:  \"Pt has a past psychiatric history of Depression, Anxiety and Substance Use.Pt reports  \" I don't feel good\". Pt endorses loose associates and paranoid thoughts \"my teeth are falling out and I need my teeth back\" . Pt not sure if she is suppose to eat or not according to the sister. Pt endorses for the past two months and recent increase in symptoms crying spells,decreased sleep 3-4 and some days none.decreased appetite lost 20- 30 pounds within the last month .Pt denies any AH/VH. Pt denies being suicidal. Pt is confused at times and a poor historian, looks to her sister for assistance.Pt's collateral ( sister) was bedside an provided details.   Pt endorsed using heroin on yesterday her UDS tested negative (positive for fentanyl). Pt has had previous substance use treatment between [id number] and [facility].\"    Collateral  Pt' sister reports precipitating event to patients symptoms -Two months ago parole sent her a letter stating July 18  coming up for parole,  killed the daughter (2009) she was 18 months old. Pt makes  paranoid statements , she thinks she has had worms and parasites in her. Pt has a new swimming pool and feels that the pool is to cleanse everyone like a Protestant. Pt has been getting confused but will get confused in mid conversation ad forgets what she says. Pt having paranoid thoughts people are tracking her. Pt has been putting her phone in the ground and wanted someone to run over it but otilia has been assisting.  5 children and has no custody of the kids.   Pt;' sister is concerned for her safety and in agreement with 
team evaluation     PHYSICAL EXAM:    BP (!) 141/88   Pulse (!) 107   Temp 98.2 °F (36.8 °C) (Oral)   Resp 16   Ht 1.575 m (5' 2\")   Wt 59.9 kg (132 lb)   SpO2 98%   BMI 24.14 kg/m²     Gen: No distress. Alert. Appears older than stated age  Eyes: PERRL. No sclera icterus. No conjunctival injection.   ENT: No discharge. Pharynx clear.   Neck: No JVD.  Trachea midline.  Resp: No accessory muscle use. No crackles. No wheezes. No rhonchi.   CV: Regular rate. Regular rhythm. No murmur.  No rub. No edema.   GI: Non-tender. Non-distended. Normal bowel sounds.   Skin: Warm and dry. No nodule on exposed extremities. No rash on exposed extremities.   M/S: No cyanosis. No joint deformity. No clubbing.   Neuro: Awake. No focal neurologic deficit on exam.  Cranial nerves II through XII intact.  Patient is able to ambulate without difficulty.  Psych: Per psychiatry team evaluation     CBC:   Recent Labs     07/08/24 2113   WBC 6.6   HGB 13.5   HCT 39.7   MCV 94.6        BMP:   Recent Labs     07/08/24 2115      K 3.5      CO2 27   BUN 5*   CREATININE <0.5*     LIVER PROFILE:   Recent Labs     07/08/24 2115   AST 37   ALT 31   BILITOT <0.2   ALKPHOS 57     PT/INR: No results for input(s): \"PROTIME\", \"INR\" in the last 72 hours.  APTT: No results for input(s): \"APTT\" in the last 72 hours.  UA:  Recent Labs     07/08/24 2113   COLORU Yellow   PHUR 7.5  7.5   CLARITYU Clear   LEUKOCYTESUR Negative   UROBILINOGEN 0.2   BILIRUBINUR Negative   BLOODU Negative   GLUCOSEU Negative          CULTURES  Results       ** No results found for the last 336 hours. **              EKG:      Encounter Date: 07/08/24   EKG 12 lead   Result Value    Ventricular Rate 82    Atrial Rate 82    P-R Interval 146    QRS Duration 94    Q-T Interval 356    QTc Calculation (Bazett) 415    P Axis 68    R Axis 73    T Axis 58    Diagnosis      Normal sinus rhythm with sinus arrhythmiaT wave abnormality, consider anterior

## 2024-07-09 NOTE — VIRTUAL HEALTH
Sophia Harrison  [id number]  1990     Social Work Behavioral Health Crisis Assessment    07/08/24    Chief Complaint: \" I don't feel good\"    HPI: Patient is a 34 y.o. White (non-) female who presents for psychosis and judi in need of a mental health exam. Patient presented to the ED on 07/08/24 from home     Past Psychiatric History:  Previous Diagnoses/symptoms: Depression and Anxiety   Previous suicide attempts/self-harm: yes   Inpatient psychiatric hospitalizations: yes  Current outpatient psychiatric provider: Denies  Current therapist: States not in therapy  Previous psychiatric medication trials: No prior medication trials  Current psychiatric medications: No current psychiatric medications  Family Psychiatric History: yes mom has depression     Sleep Hours: 3-4 sometimes none     Sleep concerns: difficulty attaining sleep    Use of sleep medications: denies    Substance Abuse History:  Tobacco: Endorses yes 2 packs a day   Alcohol: Denies  Marijuana: Denies  Stimulant: Denies  Opiates: Endorses heroin on yesterday   Benzodiazepine: Denies  Other illicit drug usage: Denies  History of substance/alcohol abuse treatment: Yes- [facility] 2 months 2017 [facility] - 2018 or 2019     Social History:  Education: 10 th grade  Living Situation/Interest: BF   Marital/Committed relationship and parenting hx:   Occupation: Unemployed  Legal History/Hx of Violence: prior arrests for theft,not on probation   Spiritual History: Denies  Psychological trauma, neglect, or abuse: daughter passed away 2009  Access to guns or other weapons: denies having access to firearms/dangerous weapons     Past Medical History:  Active Ambulatory Problems     Diagnosis Date Noted    Mood disorder, drug-induced ([facility]) 07/27/2014    Narcotic withdrawal ([facility]) 07/27/2014    Complicated grief 07/27/2014    Opiate abuse, continuous ([facility]) 08/25/2016    Chemical dependency ([facility]) 08/25/2016    Opioid dependence with

## 2024-07-09 NOTE — PROGRESS NOTES
Home Medication Reconciliation Status          [x] COMPLETE       Medication history has been reviewed and obtained from the following source(s):       [] patient/family verbal report             [] patient/family provided written list       [] external pharmacy   [] external facility list         []  Provider notified that home medication reconciliation is complete          [] IN PROGRESS       Medication reconciliation marked in progress at this time due to:       [] patient/family poor historian      [] waiting arrival of family to clarify       [] waiting for accurate list        [] external pharmacy needs called      * Follow up is needed.          [] UNABLE TO ASSESS       Medication reconciliation is incomplete and unable to assess at this time due to:       [] critical patient condition   [] patient is unresponsive        [] no family available                       [] unknown pharmacy       [] anonymous patient          * Follow up is needed.      [] Pharmacy consult placed for medication reconciliation assistance   Additional comments: Patient is currently not taking any medications.

## 2024-07-10 PROBLEM — R79.89 ABNORMAL TSH: Status: ACTIVE | Noted: 2024-07-10

## 2024-07-10 PROBLEM — R76.8 HEPATITIS C ANTIBODY TEST POSITIVE: Status: ACTIVE | Noted: 2024-07-01

## 2024-07-10 PROBLEM — R00.0 TACHYCARDIA: Status: ACTIVE | Noted: 2024-07-10

## 2024-07-10 LAB
CHOLEST SERPL-MCNC: 103 MG/DL (ref 0–199)
EST. AVERAGE GLUCOSE BLD GHB EST-MCNC: 108.3 MG/DL
HBA1C MFR BLD: 5.4 %
HDLC SERPL-MCNC: 46 MG/DL (ref 40–60)
LDLC SERPL CALC-MCNC: 35 MG/DL
TRIGL SERPL-MCNC: 109 MG/DL (ref 0–150)
VLDLC SERPL CALC-MCNC: 22 MG/DL

## 2024-07-10 PROCEDURE — 6370000000 HC RX 637 (ALT 250 FOR IP)

## 2024-07-10 PROCEDURE — 6370000000 HC RX 637 (ALT 250 FOR IP): Performed by: NURSE PRACTITIONER

## 2024-07-10 PROCEDURE — 99232 SBSQ HOSP IP/OBS MODERATE 35: CPT

## 2024-07-10 PROCEDURE — 1240000000 HC EMOTIONAL WELLNESS R&B

## 2024-07-10 PROCEDURE — 99221 1ST HOSP IP/OBS SF/LOW 40: CPT | Performed by: NURSE PRACTITIONER

## 2024-07-10 RX ORDER — QUETIAPINE FUMARATE 50 MG/1
50 TABLET, EXTENDED RELEASE ORAL NIGHTLY
Status: DISCONTINUED | OUTPATIENT
Start: 2024-07-10 | End: 2024-07-11 | Stop reason: HOSPADM

## 2024-07-10 RX ORDER — DOCUSATE SODIUM 100 MG/1
100 CAPSULE, LIQUID FILLED ORAL DAILY
Status: DISCONTINUED | OUTPATIENT
Start: 2024-07-10 | End: 2024-07-11 | Stop reason: HOSPADM

## 2024-07-10 RX ADMIN — DICYCLOMINE HYDROCHLORIDE 20 MG: 20 TABLET ORAL at 08:32

## 2024-07-10 RX ADMIN — HYDROXYZINE HYDROCHLORIDE 50 MG: 50 TABLET, FILM COATED ORAL at 23:36

## 2024-07-10 RX ADMIN — OLANZAPINE 10 MG: 10 TABLET, FILM COATED ORAL at 08:32

## 2024-07-10 RX ADMIN — QUETIAPINE FUMARATE 50 MG: 50 TABLET, EXTENDED RELEASE ORAL at 23:36

## 2024-07-10 ASSESSMENT — PAIN SCALES - GENERAL: PAINLEVEL_OUTOF10: 0

## 2024-07-10 NOTE — GROUP NOTE
Group Therapy Note    Date: 7/10/2024    Group Start Time: 1000  Group End Time: 1045  Group Topic: Cognitive Skills    [facility]    Aline Carlos LPC    Participants engaged in discussions on conflict. Participants listened to education on the \"four horsemen of conflict\" that include criticism, defensiveness, contempt, and stonewalling. They engaged in discussions to process how I-statements can increase productivity when processing a conflict.     Group Therapy Note    Attendees: 9       Notes:  Sophia engaged in discussions and shared personal connection to conflict. Sophia was able to accept feedback from others and was appropriate throughout.     Status After Intervention:  Improved    Participation Level: Active Listener and Interactive    Participation Quality: Appropriate, Attentive, Sharing, and Supportive      Speech:  normal      Thought Process/Content: Logical      Affective Functioning: Congruent      Mood: depressed      Level of consciousness:  Alert and Oriented x4      Response to Learning: Able to verbalize current knowledge/experience      Endings: None Reported    Modes of Intervention: Education, Support, Socialization, and Exploration      Discipline Responsible: /Counselor      Signature:  Aline Carlos LPC

## 2024-07-10 NOTE — PROGRESS NOTES
Group Therapy Note    Date: 7/9/2024  Start Time: 20:00  End Time:  21:00  Number of Participants: 5    Type of Group: Recreational  wrap up    Wellness Binder Information  Module Name:  /  Session Number:  /    Patient's Goal:  coping skills    Notes:  continuing to work on goal    Status After Intervention:  Unchanged    Participation Level: Active Listener and Interactive    Participation Quality: Appropriate, Attentive, and Sharing      Speech:  pressured      Thought Process/Content: Logical      Affective Functioning: Blunted      Mood: anxious      Level of consciousness:  Alert and Attentive      Response to Learning: Able to change behavior      Endings: None Reported    Modes of Intervention: Socialization and Problem-solving      Discipline Responsible: Behavorial Health Tech      Signature:  Winston Pool

## 2024-07-10 NOTE — PLAN OF CARE
Problem: Anxiety  Goal: Will report anxiety at manageable levels  Description: INTERVENTIONS:  1. Administer medication as ordered  2. Teach and rehearse alternative coping skills  3. Provide emotional support with 1:1 interaction with staff  7/10/2024 1745 by Lewis Chapa RN  Outcome: Progressing  7/10/2024 1744 by Lewis Chapa RN  Outcome: Progressing     Problem: Confusion  Goal: Confusion, delirium, dementia, or psychosis is improved or at baseline  Description: INTERVENTIONS:  1. Assess for possible contributors to thought disturbance, including medications, impaired vision or hearing, underlying metabolic abnormalities, dehydration, psychiatric diagnoses, and notify attending LIP  2. [place] high risk fall precautions, as indicated  3. Provide frequent short contacts to provide reality reorientation, refocusing and direction  4. Decrease environmental stimuli, including noise as appropriate  5. Monitor and intervene to maintain adequate nutrition, hydration, elimination, sleep and activity  6. If unable to ensure safety without constant attention obtain sitter and review sitter guidelines with assigned personnel  7. Initiate Psychosocial CNS and Spiritual Care consult, as indicated  7/10/2024 1745 by Lewis Chapa RN  Outcome: Progressing  7/10/2024 1744 by Lewis Chapa RN  Outcome: Progressing     Problem: Self Harm/Suicidality  Goal: Will have no self-injury during hospital stay  Description: INTERVENTIONS:  1.  Ensure constant observer at bedside with Q15M safety checks  2.  Maintain a safe environment  3.  Secure patient belongings  4.  Ensure family/visitors adhere to safety recommendations  5.  Ensure safety tray has been added to patient's diet order  6.  Every shift and PRN: Re-assess suicidal risk via Frequent Screener    7/10/2024 1745 by Lewis Chapa RN  Outcome: Progressing  7/10/2024 1744 by Lewis Chapa RN  Outcome: Progressing     Problem: Depression  Goal: Will be euthymic

## 2024-07-10 NOTE — PLAN OF CARE
Sophia stayed most of the time in room and seemingly anxious. Told the writer that she feels very sick and needs to take medication. States she is having withdrawal and need subutex. Acknowledge feelings and concerned. Anxiety scored 6 and observed very somatic. She denies AVH and delusional thoughts. No apparent signs of aggressive behavior. Still for further observation.   Problem: Psychosis  Goal: Will report no hallucinations or delusions  Outcome: Not Progressing     Problem: Anxiety  Goal: Will report anxiety at manageable levels  7/9/2024 2208 by Pascual Solorzano, RN  Outcome: Not Progressing  Flowsheets (Taken 7/9/2024 2208)  Will report anxiety at manageable levels:   Administer medication as ordered   Provide emotional support with 1:1 interaction with staff   Teach and rehearse alternative coping skills  7/9/2024 1501 by Fran Montejo, RN  Outcome: Progressing     Problem: Psychosis  Goal: Will report no hallucinations or delusions  Outcome: Not Progressing     Problem: Behavior  Goal: Pt/Family maintain appropriate behavior and adhere to behavioral management agreement, if implemented  Outcome: Progressing  Flowsheets (Taken 7/9/2024 2208)  Patient/family maintains appropriate behavior and adheres to behavioral management agreement, if implemented:   Notify security of behavior or suspected illegal substances which indicate the need for search of the patient and/or belongings   Encourage verbalization of thoughts and concerns in a socially appropriate manner   Assess patient/family’s coping skills and  non-compliant behavior (including use of illegal substances)   Utilize positive, consistent limit setting strategies supporting safety of patient, staff and others   Encourage participation in the decision making process about the behavioral management agreement   Implement a Health Care Agreement if patient meets criteria   If a patient’s behavior jeopardizes the safety of the patient, staff, or others refer

## 2024-07-10 NOTE — PROGRESS NOTES
Pt boyfriend called with concerns about patient being d/c. Pt gave verbal consent to tell him it is a possibility and boyfriend states she is still complaining of +AH to him and reports she is still seeing signs and thinks people are trying to kill her. He does not feel safe with her returning home but also does not want her knowing he is concerned because he doesn't not want her mad at him and not being honest about her feelings.

## 2024-07-10 NOTE — PROGRESS NOTES
Department of Psychiatry  AttendingProgress Note  Chief Complaint: Psychosis    Patient's chart was reviewed and collaborated with  about the treatment plan.    SUBJECTIVE:    Pt up in groups today.  Interrupted sleep last night, racing thoughts, unable to calm her mind down to rest.  Pt now agreeable to medication.  States she has been on Seroquel in the past and willing to restart.  Behaviorally stable.  Hoping to connect to [facility] for assistance with her substance use.      Patient is feeling better. Suicidal ideation:  denies suicidal ideation.  Patient does not have medication side effects.    ROS: Patient has new complaints: no  Sleeping adequately:  Yes   Appetite adequate: Yes  Attending groups: Yes  Visitors:Yes    OBJECTIVE    Physical  VITALS:  /73   Pulse (!) 108   Temp 97.5 °F (36.4 °C) (Temporal)   Resp 18   Ht 1.575 m (5' 2\")   Wt 59.9 kg (132 lb)   SpO2 95%   BMI 24.14 kg/m²     Mental Status Examination:  Patients appearance was well-appearing and hospital attire. Thoughts are Logical. Homicidal ideations none.  No abnormal movements, tics or mannerisms.  Memory intact Aims 0. Concentration Fair.   Alert and oriented X 4. Insight and Judgement normal insight and judgment. Patient was cooperative. Patient gait normal. Mood dysthymic, affect normal affect Hallucinations Absent, suicidal ideations no specific plan to harm self Speech normal pitch and normal volume    Data  Labs:   Admission on 07/08/2024   Component Date Value Ref Range Status    WBC 07/08/2024 6.6  4.0 - 11.0 K/uL Final    RBC 07/08/2024 4.20  4.00 - 5.20 M/uL Final    Hemoglobin 07/08/2024 13.5  12.0 - 16.0 g/dL Final    Hematocrit 07/08/2024 39.7  36.0 - 48.0 % Final    MCV 07/08/2024 94.6  80.0 - 100.0 fL Final    MCH 07/08/2024 32.3  26.0 - 34.0 pg Final    MCHC 07/08/2024 34.1  31.0 - 36.0 g/dL Final    RDW 07/08/2024 13.4  12.4 - 15.4 % Final    Platelets 07/08/2024 192  135 - 450 K/uL Final    MPV  sterile water 2 mL injection, 10 mg, IntraMUSCular, Q4H PRN  benztropine mesylate (COGENTIN) injection 2 mg, 2 mg, IntraMUSCular, BID PRN  hydrOXYzine HCl (ATARAX) tablet 50 mg, 50 mg, Oral, TID PRN  traZODone (DESYREL) tablet 50 mg, 50 mg, Oral, Nightly PRN  buprenorphine (SUBUTEX) SL tablet 4 mg, 4 mg, SubLINGual, PRN  dicyclomine (BENTYL) tablet 20 mg, 20 mg, Oral, Q6H PRN  promethazine (PHENERGAN) tablet 25 mg, 25 mg, Oral, Q6H PRN    ASSESSMENT AND PLAN    Principal Problem:    Psychosis, unspecified psychosis type (HCC)  Resolved Problems:    * No resolved hospital problems. *       1.Patient's symptoms   are improving  2.Probable discharge is tomorrow  3.Discharge planning is incomplete  4. Suicidal ideation is better  5. Total time with patient was 40 minutes and more than 50 % of that time was spent counseling the patient on their symptoms, treatment and expected goals.     Poornima Parks, PMHNP-BC

## 2024-07-10 NOTE — PROGRESS NOTES
Pt has been visible on unit but mostly isolative to self. She did attend groups and noted to eat meals. She states her sleep is okay but did have wake up a lot last night. Pt denies current SI/HI/VH/AH and agrees to communicate with staff if no longer feel safe or in control. She does voice some paranoia about a staff member here she knows and concerns that she is here \"to watch her and make sure she is doing okay.\" Assured patient of her HIPAA rights and privacy rights and pt content. Pt states she is here d/t the anniversary of her child passing and her child's father being up for parole and this is very triggering to her. She states she still sees her other kids \"whenever she wants\" she plans to d/c to her sisters. Talk about her boyfriend she states they are on and off again but she loves him. Pt plans to follow up with MH treatment outpatient.

## 2024-07-10 NOTE — PROGRESS NOTES
Pt complaint of withdrawal symptoms. Assessment done and cows scored 6. PRN subutex SL given at 2025. Seen at times. Needs attended.

## 2024-07-10 NOTE — PROGRESS NOTES
Pt complaint of restless, sleepless and irritable mood. PRN zyprexa + promethazine given at 2304. Settled on bed afterwards. Seen at times. Needs attended.

## 2024-07-11 VITALS
WEIGHT: 132 LBS | BODY MASS INDEX: 24.29 KG/M2 | HEART RATE: 123 BPM | OXYGEN SATURATION: 98 % | SYSTOLIC BLOOD PRESSURE: 116 MMHG | HEIGHT: 62 IN | RESPIRATION RATE: 16 BRPM | DIASTOLIC BLOOD PRESSURE: 80 MMHG | TEMPERATURE: 98 F

## 2024-07-11 LAB
T4 FREE SERPL-MCNC: 1.9 NG/DL (ref 0.9–1.8)
TSH SERPL DL<=0.005 MIU/L-ACNC: 0.2 UIU/ML (ref 0.27–4.2)

## 2024-07-11 PROCEDURE — 36415 COLL VENOUS BLD VENIPUNCTURE: CPT

## 2024-07-11 PROCEDURE — 84443 ASSAY THYROID STIM HORMONE: CPT

## 2024-07-11 PROCEDURE — 6370000000 HC RX 637 (ALT 250 FOR IP): Performed by: NURSE PRACTITIONER

## 2024-07-11 PROCEDURE — 99239 HOSP IP/OBS DSCHRG MGMT >30: CPT | Performed by: PSYCHIATRY & NEUROLOGY

## 2024-07-11 PROCEDURE — 84439 ASSAY OF FREE THYROXINE: CPT

## 2024-07-11 PROCEDURE — 6370000000 HC RX 637 (ALT 250 FOR IP)

## 2024-07-11 PROCEDURE — 5130000000 HC BRIDGE APPOINTMENT

## 2024-07-11 RX ORDER — BUPRENORPHINE 2 MG/1
4 TABLET SUBLINGUAL DAILY
Qty: 6 TABLET | Refills: 0 | Status: SHIPPED | OUTPATIENT
Start: 2024-07-11 | End: 2024-07-11

## 2024-07-11 RX ORDER — CLONIDINE HYDROCHLORIDE 0.1 MG/1
0.1 TABLET ORAL 2 TIMES DAILY
Status: DISCONTINUED | OUTPATIENT
Start: 2024-07-11 | End: 2024-07-11 | Stop reason: HOSPADM

## 2024-07-11 RX ORDER — BUPRENORPHINE 2 MG/1
4 TABLET SUBLINGUAL DAILY
Qty: 6 TABLET | Refills: 0 | Status: SHIPPED | OUTPATIENT
Start: 2024-07-11 | End: 2024-07-14

## 2024-07-11 RX ORDER — HYDROXYZINE 50 MG/1
50 TABLET, FILM COATED ORAL 3 TIMES DAILY PRN
Qty: 30 TABLET | Refills: 0 | Status: SHIPPED | OUTPATIENT
Start: 2024-07-11 | End: 2024-07-21

## 2024-07-11 RX ORDER — QUETIAPINE FUMARATE 50 MG/1
50 TABLET, EXTENDED RELEASE ORAL NIGHTLY
Qty: 30 TABLET | Refills: 0 | Status: SHIPPED | OUTPATIENT
Start: 2024-07-11

## 2024-07-11 RX ORDER — PSEUDOEPHEDRINE HCL 30 MG
100 TABLET ORAL DAILY
Qty: 30 CAPSULE | Refills: 0 | Status: SHIPPED | OUTPATIENT
Start: 2024-07-12

## 2024-07-11 RX ORDER — TRAZODONE HYDROCHLORIDE 50 MG/1
50 TABLET ORAL NIGHTLY PRN
Qty: 30 TABLET | Refills: 0 | Status: SHIPPED | OUTPATIENT
Start: 2024-07-11

## 2024-07-11 RX ORDER — CLONIDINE HYDROCHLORIDE 0.1 MG/1
0.1 TABLET ORAL 2 TIMES DAILY
Qty: 60 TABLET | Refills: 0 | Status: SHIPPED | OUTPATIENT
Start: 2024-07-11

## 2024-07-11 RX ADMIN — HYDROXYZINE HYDROCHLORIDE 50 MG: 50 TABLET, FILM COATED ORAL at 08:27

## 2024-07-11 RX ADMIN — OLANZAPINE 10 MG: 10 TABLET, FILM COATED ORAL at 10:08

## 2024-07-11 RX ADMIN — DOCUSATE SODIUM 100 MG: 100 CAPSULE, LIQUID FILLED ORAL at 08:27

## 2024-07-11 RX ADMIN — NICOTINE POLACRILEX 2 MG: 2 LOZENGE ORAL at 06:10

## 2024-07-11 RX ADMIN — PROMETHAZINE HYDROCHLORIDE 25 MG: 25 TABLET ORAL at 08:27

## 2024-07-11 RX ADMIN — BUPRENORPHINE 4 MG: 2 TABLET SUBLINGUAL at 11:02

## 2024-07-11 RX ADMIN — NICOTINE POLACRILEX 2 MG: 2 LOZENGE ORAL at 08:27

## 2024-07-11 RX ADMIN — CLONIDINE HYDROCHLORIDE 0.1 MG: 0.1 TABLET ORAL at 10:55

## 2024-07-11 ASSESSMENT — PAIN SCALES - GENERAL: PAINLEVEL_OUTOF10: 2

## 2024-07-11 ASSESSMENT — PAIN SCALES - WONG BAKER: WONGBAKER_NUMERICALRESPONSE: 0;2

## 2024-07-11 NOTE — PLAN OF CARE
Problem: Pain  Goal: Verbalizes/displays adequate comfort level or baseline comfort level  Outcome: Progressing     Problem: Anxiety  Goal: Will report anxiety at manageable levels  Description: INTERVENTIONS:  1. Administer medication as ordered  2. Teach and rehearse alternative coping skills  3. Provide emotional support with 1:1 interaction with staff  7/11/2024 0105 by Elisa Walton RN  Outcome: Progressing     Problem: Confusion  Goal: Confusion, delirium, dementia, or psychosis is improved or at baseline  Description: INTERVENTIONS:  1. Assess for possible contributors to thought disturbance, including medications, impaired vision or hearing, underlying metabolic abnormalities, dehydration, psychiatric diagnoses, and notify attending LIP  2. Reynolds high risk fall precautions, as indicated  3. Provide frequent short contacts to provide reality reorientation, refocusing and direction  4. Decrease environmental stimuli, including noise as appropriate  5. Monitor and intervene to maintain adequate nutrition, hydration, elimination, sleep and activity  6. If unable to ensure safety without constant attention obtain sitter and review sitter guidelines with assigned personnel  7. Initiate Psychosocial CNS and Spiritual Care consult, as indicated  7/11/2024 0105 by Elisa Walton RN  Outcome: Progressing     Problem: Drug Abuse/Detox  Goal: Will have no detox symptoms and will verbalize plan for changing drug-related behavior  Description: INTERVENTIONS:  1. Administer medication as ordered  2. Monitor physical status  3. Provide emotional support with 1:1 interaction with staff  4. Encourage  recovery focused treatment   Outcome: Progressing     Problem: Self Harm/Suicidality  Goal: Will have no self-injury during hospital stay  Description: INTERVENTIONS:  1.  Ensure constant observer at bedside with Q15M safety checks  2.  Maintain a safe environment  3.  Secure patient belongings  4.  Ensure

## 2024-07-11 NOTE — BH NOTE
Mica Bray NP, notified of COWS score, Zyprexa just given states to hold suboxone re-score COWS in one hour.

## 2024-07-11 NOTE — PLAN OF CARE
Center                                                                                                                   ( ) Patient refused counseling  (x ) Patient refused referral  ( ) Patient refused prescription upon discharge  ( ) Patient has not smoked in the last 30 days    Metabolic Screening:    Lab Results   Component Value Date    LABA1C 5.4 07/09/2024       Lab Results   Component Value Date    CHOL 103 07/09/2024     Lab Results   Component Value Date    TRIG 109 07/09/2024     Lab Results   Component Value Date    HDL 46 07/09/2024     No components found for: \"LDLCAL\"  No components found for: \"LABVLDL\"    Bridge Appointment completed: Reviewed Discharge Instructions with patient.    Patient verbalizes understanding and agreement with the discharge plan using the teachback method.       Vaccinations (josh X if applicable and completed):  ( ) Patient states already received influenza vaccine elsewhere  ( ) Patient received influenza vaccine during this hospitalization  ( x) Patient refused influenza vaccine at this time  ( ) Not offered     Angy Lu RN

## 2024-07-11 NOTE — PLAN OF CARE
[facility]  Treatment Team Note  Review Date & Time: 07/11/24  0948    Patient was not in treatment team      Status EXAM:   Mental Status and Behavioral Exam  Normal: No  Level of Assistance: Independent/Self  Facial Expression: Flat, Brightened (Brightens w/ interaction)  Affect: Congruent  Level of Consciousness: Alert  Frequency of Checks: 4 times per hour, close  Mood:Normal: No  Mood: Anxious  Motor Activity:Normal: Yes  Eye Contact: Good  Observed Behavior: Cooperative, Friendly  Sexual Misconduct History: Current - no  Preception: [place] to person, [place] to place, [place] to time, [place] to situation  Attention:Normal: Yes  Attention: Distractible  Thought Processes: Unremarkable  Thought Content:Normal: Yes  Thought Content: Preoccupations, Paranoia  Depression Symptoms: Feelings of helplessness, Isolative  Anxiety Symptoms: Generalized  Gisele Symptoms: No problems reported or observed.  Hallucinations: None (Sophia denies; not noted to be RTIS)  Delusions: No (No delusional statements made to this writer this shift)  Delusions: Paranoid, Somatic  Memory:Normal: No  Memory: Poor recent  Insight and Judgment: No  Insight and Judgment: Poor judgment, Poor insight      Suicide Risk CSSR-S:  1) Within the past month, have you wished you were dead or wished you could go to sleep and not wake up? : No  2) Have you actually had any thoughts of killing yourself? : No  3) Have you been thinking about how you might kill yourself? : No  5) Have you started to work out or worked out the details of how to kill yourself? Do you intend to carry out this plan? : No  6) Have you ever done anything, started to do anything, or prepared to do anything to end your life?: Yes      PLAN/TREATMENT RECOMMENDATIONS UPDATE:   Patient will take medication as prescribed, eat 75% of meals, attend groups, participate in milieu activities, participate in treatment team and care planning for discharge and follow up.            Angy Lu RN

## 2024-07-11 NOTE — GROUP NOTE
Group Therapy Note    Date: 7/11/2024    Group Start Time: 1000  Group End Time: 1050  Group Topic: Psychoeducation    [facility]    Thalia Monk LISW        Group Therapy Note    Attendees: 10       Patient's Goal:  to learn and discuss coping skills that start with each letter of the alphabet. Pt's asked to apply to their lives.     Notes:  pt came to group late. She actively participated in group discussion and was able to apply to herself.    Status After Intervention:  Improved    Participation Level: Active Listener and Interactive    Participation Quality: Appropriate, Attentive, and Sharing      Speech:  normal      Thought Process/Content: Logical      Affective Functioning: Congruent      Mood: anxious      Level of consciousness:  Alert      Response to Learning: Able to verbalize current knowledge/experience, Able to verbalize/acknowledge new learning, and Progressing to goal      Endings: None Reported    Modes of Intervention: Education, Support, Socialization, Exploration, Clarifying, and Activity      Discipline Responsible: /Counselor      Signature:  MICKIE Hoyt

## 2024-07-11 NOTE — TRANSITION OF CARE
[facility] Transition Record    Patient Name: Sophia Harrison  YOB: 1990   Medical Record Number: [id number]  Date of Admission: 7/8/2024 10:18 PM   Date of Discharge: 7/11/2024    Attending Provider: Kenneth Mattson MD   Discharging Provider: Poornima Parks APRN - CNP  To contact this individual call [phone] and ask the  to page.  If unavailable, ask to be transferred to Behavioral Health Provider on call.  A Behavioral Health Provider will be available on call 24/7 and during holidays.    Primary Care Provider: No primary care provider on file.    Allergies   Allergen Reactions    Suboxone [Buprenorphine Hcl-Naloxone Hcl] Nausea And Vomiting       Reason for Admission: Sophia Harrison is a 34 y.o. female presenting for psychiatric evaluation.  Patient refusing to answer most of my questions.  Patient states \"she does not want me to talk to you\".   Sister states that over the past few months she is off to be referring to \"they\" as if an internal stimuli is telling her what to do.  Sister states that patient has been talking about the devil being in people as well as Satan being in her child.  States patient has been eating and drinking less than normal and has been forgetting meals and not taking care of herself appropriately.  Sister states that this occurred just about the time she got a letter that her ex  who had killed their child was getting out of senior care.  History is very limited per patient.     Admission Diagnosis: Psychosis, unspecified psychosis type (HCC) [F29]    * No surgery found *    Results for orders placed or performed during the hospital encounter of 07/08/24   CBC with Auto Differential   Result Value Ref Range    WBC 6.6 4.0 - 11.0 K/uL    RBC 4.20 4.00 - 5.20 M/uL    Hemoglobin 13.5 12.0 - 16.0 g/dL    Hematocrit 39.7 36.0 - 48.0 %    MCV 94.6 80.0 - 100.0 fL    MCH 32.3 26.0 - 34.0 pg    MCHC 34.1 31.0 - 36.0 g/dL    RDW 13.4 12.4 - 15.4 %

## 2024-07-12 ENCOUNTER — FOLLOWUP TELEPHONE ENCOUNTER (OUTPATIENT)
Dept: PSYCHIATRY | Age: 34
End: 2024-07-12

## 2024-07-12 NOTE — DISCHARGE SUMMARY
Discharge Summary   Admit Date: 7/8/2024   Discharge Date:  7/11/2024    Condition at DC stable  Spent over 40 minutes with patient and staff on DCplanning with more than 50 % of time spent with patient discussing care  Final Dx: axis I: Psychosis, unspecified psychosis type (HCC)   Axis 2: No diagnosis  Ce 3: See Medical History    And Present on Admission:   Psychosis, unspecified psychosis type (HCC)   Abnormal TSH   Opiate abuse, continuous (HCC)   Hepatitis C antibody test positive   Tachycardia     Axis 4: Problems related to the social environment  Axis 5:  On Admission: 31-40 impairment in reality testing At Discharge: 51-60 moderate symptoms   All conditions on Axis 1 and Axis 2 and active problems on Axis 3 were treated while patient was hospitalized. (  Active Hospital Problems    Diagnosis Date Noted    Abnormal TSH [R79.89] 07/10/2024    Tachycardia [R00.0] 07/10/2024    Psychosis, unspecified psychosis type (HCC) [F29] 07/09/2024    Hepatitis C antibody test positive [R76.8] 07/01/2024    Opiate abuse, continuous (HCC) [F11.10] 08/25/2016   )   Condition on DC  Mood and affect are stable and pt is not suicidal   VITALS:  /80   Pulse (!) 123   Temp 98 °F (36.7 °C) (Oral)   Resp 16   Ht 1.575 m (5' 2\")   Wt 59.9 kg (132 lb)   SpO2 98%   BMI 24.14 kg/m²   Brief Summary Present Illness     Per Poornima CANTU  CC:  Psychosis     HPI:   Patient seen in room on Adult Behavioral Unit.   Patient is a 34 y.o. female who presents  to [facility] ED for judi and paranoia in need of a mental health exam.   Per tele-psych notes:  \"Pt has a past psychiatric history of Depression, Anxiety and Substance Use.Pt reports  \" I don't feel good\". Pt endorses loose associates and paranoid thoughts \"my teeth are falling out and I need my teeth back\" . Pt not sure if she is suppose to eat or not according to the sister. Pt endorses for the past two months and recent increase in symptoms crying spells,decreased  normal insight and judgment Cognition:  oriented to person, place, and time  Fund of Knowledge: average  IQ:average Memory: intact  Suicide:  No specific plan to harm self  Sleep: sleeps through the night  Appetite: ok   Reassess Mela Risk:  no specific plan to harm self Pt has phone numbers to contact if suicidal thoughts recur and states pt will return to the hospital if suicidal feelings return.   Hospital Routine Meds:     Hospital PRN Meds:    Discharge Meds:    Discharge Medication List as of 7/11/2024  2:07 PM             Details   hydrOXYzine HCl (ATARAX) 50 MG tablet Take 1 tablet by mouth 3 times daily as needed for Anxiety, Disp-30 tablet, R-0Normal      traZODone (DESYREL) 50 MG tablet Take 1 tablet by mouth nightly as needed for Sleep, Disp-30 tablet, R-0Normal      cloNIDine (CATAPRES) 0.1 MG tablet Take 1 tablet by mouth 2 times daily, Disp-60 tablet, R-0Normal      QUEtiapine (SEROQUEL XR) 50 MG extended release tablet Take 1 tablet by mouth nightly, Disp-30 tablet, R-0Normal      docusate sodium (COLACE, DULCOLAX) 100 MG CAPS Take 100 mg by mouth daily, Disp-30 capsule, R-0Normal                Details   buprenorphine (SUBUTEX) 2 MG SUBL SL tablet Place 2 tablets under the tongue daily for 3 days. Max Daily Amount: 4 mg, Disp-6 tablet, R-0Print               Disposition - Residence Home     Follow Up:  See Discharge Instructions   Kenneth Mattson MD  Physician Psychiatry

## 2024-07-13 LAB — LIPASE SERPL-CCNC: 22 U/L (ref 13–60)

## 2024-07-15 ENCOUNTER — FOLLOWUP TELEPHONE ENCOUNTER (OUTPATIENT)
Dept: PSYCHIATRY | Age: 34
End: 2024-07-15

## 2024-07-16 ENCOUNTER — FOLLOWUP TELEPHONE ENCOUNTER (OUTPATIENT)
Dept: PSYCHIATRY | Age: 34
End: 2024-07-16